# Patient Record
Sex: FEMALE | Race: WHITE | NOT HISPANIC OR LATINO | URBAN - METROPOLITAN AREA
[De-identification: names, ages, dates, MRNs, and addresses within clinical notes are randomized per-mention and may not be internally consistent; named-entity substitution may affect disease eponyms.]

---

## 2018-08-28 ENCOUNTER — OUTPATIENT (OUTPATIENT)
Dept: OUTPATIENT SERVICES | Facility: HOSPITAL | Age: 68
LOS: 1 days | Discharge: HOME | End: 2018-08-28

## 2018-08-28 VITALS — SYSTOLIC BLOOD PRESSURE: 112 MMHG | DIASTOLIC BLOOD PRESSURE: 62 MMHG | HEART RATE: 54 BPM | RESPIRATION RATE: 18 BRPM

## 2018-08-28 VITALS
DIASTOLIC BLOOD PRESSURE: 60 MMHG | HEART RATE: 56 BPM | SYSTOLIC BLOOD PRESSURE: 135 MMHG | RESPIRATION RATE: 15 BRPM | WEIGHT: 156.09 LBS | OXYGEN SATURATION: 97 % | TEMPERATURE: 97 F | HEIGHT: 64.96 IN

## 2018-08-28 DIAGNOSIS — Z12.11 ENCOUNTER FOR SCREENING FOR MALIGNANT NEOPLASM OF COLON: Chronic | ICD-10-CM

## 2018-08-28 NOTE — PRE-ANESTHESIA EVALUATION ADULT - NSANTHOSAYNRD_GEN_A_CORE
No. CHE screening performed.  STOP BANG Legend: 0-2 = LOW Risk; 3-4 = INTERMEDIATE Risk; 5-8 = HIGH Risk

## 2018-08-30 DIAGNOSIS — K64.4 RESIDUAL HEMORRHOIDAL SKIN TAGS: ICD-10-CM

## 2018-08-30 DIAGNOSIS — Z12.11 ENCOUNTER FOR SCREENING FOR MALIGNANT NEOPLASM OF COLON: ICD-10-CM

## 2019-07-08 PROBLEM — Z00.00 ENCOUNTER FOR PREVENTIVE HEALTH EXAMINATION: Status: ACTIVE | Noted: 2019-07-08

## 2019-09-16 ENCOUNTER — APPOINTMENT (OUTPATIENT)
Dept: GASTROENTEROLOGY | Facility: CLINIC | Age: 69
End: 2019-09-16

## 2019-12-30 ENCOUNTER — EMERGENCY (EMERGENCY)
Facility: HOSPITAL | Age: 69
LOS: 0 days | Discharge: HOME | End: 2019-12-30
Attending: EMERGENCY MEDICINE | Admitting: EMERGENCY MEDICINE
Payer: MEDICAID

## 2019-12-30 VITALS
RESPIRATION RATE: 18 BRPM | SYSTOLIC BLOOD PRESSURE: 122 MMHG | HEART RATE: 56 BPM | DIASTOLIC BLOOD PRESSURE: 64 MMHG | OXYGEN SATURATION: 97 % | TEMPERATURE: 98 F

## 2019-12-30 VITALS
SYSTOLIC BLOOD PRESSURE: 121 MMHG | RESPIRATION RATE: 18 BRPM | HEART RATE: 55 BPM | DIASTOLIC BLOOD PRESSURE: 53 MMHG | OXYGEN SATURATION: 97 %

## 2019-12-30 DIAGNOSIS — R10.31 RIGHT LOWER QUADRANT PAIN: ICD-10-CM

## 2019-12-30 DIAGNOSIS — R10.30 LOWER ABDOMINAL PAIN, UNSPECIFIED: ICD-10-CM

## 2019-12-30 DIAGNOSIS — Z12.11 ENCOUNTER FOR SCREENING FOR MALIGNANT NEOPLASM OF COLON: Chronic | ICD-10-CM

## 2019-12-30 LAB
ALBUMIN SERPL ELPH-MCNC: 3.9 G/DL — SIGNIFICANT CHANGE UP (ref 3.5–5.2)
ALP SERPL-CCNC: 60 U/L — SIGNIFICANT CHANGE UP (ref 30–115)
ALT FLD-CCNC: 17 U/L — SIGNIFICANT CHANGE UP (ref 0–41)
ANION GAP SERPL CALC-SCNC: 14 MMOL/L — SIGNIFICANT CHANGE UP (ref 7–14)
APPEARANCE UR: CLEAR — SIGNIFICANT CHANGE UP
AST SERPL-CCNC: 34 U/L — SIGNIFICANT CHANGE UP (ref 0–41)
BILIRUB DIRECT SERPL-MCNC: <0.2 MG/DL — SIGNIFICANT CHANGE UP (ref 0–0.2)
BILIRUB INDIRECT FLD-MCNC: >0.5 MG/DL — SIGNIFICANT CHANGE UP (ref 0.2–1.2)
BILIRUB SERPL-MCNC: 0.7 MG/DL — SIGNIFICANT CHANGE UP (ref 0.2–1.2)
BILIRUB UR-MCNC: NEGATIVE — SIGNIFICANT CHANGE UP
BUN SERPL-MCNC: 20 MG/DL — SIGNIFICANT CHANGE UP (ref 10–20)
CALCIUM SERPL-MCNC: 9.1 MG/DL — SIGNIFICANT CHANGE UP (ref 8.5–10.1)
CHLORIDE SERPL-SCNC: 105 MMOL/L — SIGNIFICANT CHANGE UP (ref 98–110)
CO2 SERPL-SCNC: 20 MMOL/L — SIGNIFICANT CHANGE UP (ref 17–32)
COLOR SPEC: SIGNIFICANT CHANGE UP
CREAT SERPL-MCNC: 0.7 MG/DL — SIGNIFICANT CHANGE UP (ref 0.7–1.5)
DIFF PNL FLD: SIGNIFICANT CHANGE UP
GLUCOSE SERPL-MCNC: 98 MG/DL — SIGNIFICANT CHANGE UP (ref 70–99)
GLUCOSE UR QL: NEGATIVE — SIGNIFICANT CHANGE UP
HCT VFR BLD CALC: 50 % — HIGH (ref 37–47)
HGB BLD-MCNC: 16.1 G/DL — HIGH (ref 12–16)
KETONES UR-MCNC: NEGATIVE — SIGNIFICANT CHANGE UP
LACTATE SERPL-SCNC: 0.7 MMOL/L — SIGNIFICANT CHANGE UP (ref 0.7–2)
LEUKOCYTE ESTERASE UR-ACNC: NEGATIVE — SIGNIFICANT CHANGE UP
LIDOCAIN IGE QN: 41 U/L — SIGNIFICANT CHANGE UP (ref 7–60)
MCHC RBC-ENTMCNC: 28.6 PG — SIGNIFICANT CHANGE UP (ref 27–31)
MCHC RBC-ENTMCNC: 32.2 G/DL — SIGNIFICANT CHANGE UP (ref 32–37)
MCV RBC AUTO: 88.8 FL — SIGNIFICANT CHANGE UP (ref 81–99)
NITRITE UR-MCNC: NEGATIVE — SIGNIFICANT CHANGE UP
NRBC # BLD: 0 /100 WBCS — SIGNIFICANT CHANGE UP (ref 0–0)
PH UR: 5.5 — SIGNIFICANT CHANGE UP (ref 5–8)
PLATELET # BLD AUTO: 405 K/UL — HIGH (ref 130–400)
POTASSIUM SERPL-MCNC: 5.3 MMOL/L — HIGH (ref 3.5–5)
POTASSIUM SERPL-SCNC: 5.3 MMOL/L — HIGH (ref 3.5–5)
PROT SERPL-MCNC: 7.2 G/DL — SIGNIFICANT CHANGE UP (ref 6–8)
PROT UR-MCNC: NEGATIVE — SIGNIFICANT CHANGE UP
RBC # BLD: 5.63 M/UL — HIGH (ref 4.2–5.4)
RBC # FLD: 14.6 % — HIGH (ref 11.5–14.5)
SODIUM SERPL-SCNC: 139 MMOL/L — SIGNIFICANT CHANGE UP (ref 135–146)
SP GR SPEC: 1.02 — SIGNIFICANT CHANGE UP (ref 1.01–1.02)
UROBILINOGEN FLD QL: SIGNIFICANT CHANGE UP
WBC # BLD: 6.49 K/UL — SIGNIFICANT CHANGE UP (ref 4.8–10.8)
WBC # FLD AUTO: 6.49 K/UL — SIGNIFICANT CHANGE UP (ref 4.8–10.8)

## 2019-12-30 PROCEDURE — 99284 EMERGENCY DEPT VISIT MOD MDM: CPT

## 2019-12-30 PROCEDURE — 74177 CT ABD & PELVIS W/CONTRAST: CPT | Mod: 26

## 2019-12-30 NOTE — ED PROVIDER NOTE - OBJECTIVE STATEMENT
69 year old female, no past medical history, who presents with intermittent, non-radiating suprapubic pain x1 month. Denies fever, chills, n/v/d, vaginal discharge/bleeding, dysuria/hematuria, rash. Pt reports hx similar pain and was diagnosed with UTI at that time. No hx abdominal surgeries. Pain worse after bowel movement, improved with advil. 69 year old female, no past medical history, who presents with intermittent, non-radiating suprapubic pain x1 month. Denies fever, chills, n/v/d, vaginal discharge/bleeding, dysuria/hematuria, rash. Pt reports hx similar pain and was diagnosed with UTI at that time. Pain worse after urination, moderately improved with Advil. No hx abdominal surgeries. Colonoscopy 8/2018, no significant findings. 69 year old female, no past medical history, who presents with intermittent, non-radiating suprapubic pain x1 month. Denies fever, chills, n/v/d, vaginal discharge/bleeding, dysuria/hematuria, rash. Pt reports hx similar pain and was diagnosed with UTI at that time. Pain worse after urination, moderately improved with Advil. No hx abdominal surgeries. Colonoscopy 8/2018 without significant findings.

## 2019-12-30 NOTE — ED PROVIDER NOTE - PROGRESS NOTE DETAILS
I personally supervised CHECO Baptiste and agree with her assessment and plan Patient and daughter sts wants to AMA from ED. Pt dully understands ricks of leaving against medical advice. Return precautions given. PT REFUSES TO WAIT FOR CT SCAN. PT REQUESTS DAUGHTER AS  AND REFUSES  PHONE. PT REPORTS SHE HAS AN EYE DOCTOR APPT AND DOES NOT WANT TO MISS IT. PT GIVEN STRICT RETURN INSTRUCTIONS AND INFORMED OF SIGNING AMA. PT AWARE FO RISK OF LEAVING AND WILL RETURN FOR ANY CONCERNS.

## 2019-12-30 NOTE — ED PROVIDER NOTE - PATIENT PORTAL LINK FT
You can access the FollowMyHealth Patient Portal offered by [facility] by registering at the following website: [email or web address]. By joining [employer]’s FollowMyHealth portal, you will also be able to view your health information using other applications (apps) compatible with our system. You can access the FollowMyHealth Patient Portal offered by [facility] by registering at the following website: [email or web address]. By joining [employer]’s FollowMyHealth portal, you will also be able to view your health information using other applications (apps) compatible with our system.

## 2019-12-30 NOTE — ED PROVIDER NOTE - ATTENDING CONTRIBUTION TO CARE
I personally evaluated the patient. I reviewed the Resident’s or Physician Assistant’s note (as assigned above), and agree with the findings and plan except as documented in my note.   70 Y/O F C/O LOWER ABD PAIN X 3 MONTHS. PAIN IS WORSE IN THE MORNINGS AFTER URINATING AND THEN IMPROVES OVER THE COURSE OF THE DAY. PAIN ALSO IMPROVES WITH MOTRIN.  PAIN IS NONRADIATING AND IS DULL. PT WITH [facility] 2/2019 AND WAS DIAGNOSED WITH A UTI. NO DYSURIA, HEMATURIA, FREQUENCY. NO BACK PAIN. NO FEVER, CHILLS. NO N/V. NORMAL BMS. VITALS NOTED. ALERT OX3 NAD WELL APPEARING. NCAT PERRL. EOMI. OP NORMAL. MMM. NECK SUPPLE. LUNGS CLEAR B/L. RRR. S1S2. ABD- SOFT + MILD LOWER ABD TENDERNESS, RLQ>LLQ. NO REBOUND OR GUARDING. BACK NONTENDER. NO CVAT B/L. NO LEG EDEMA. CN 2-12 INTACT. NEURO EXAM NONFOCAL.

## 2019-12-30 NOTE — ED PROVIDER NOTE - NS ED ROS FT
Review of Systems:  	•	CONSTITUTIONAL: no fever, no diaphoresis, no chills  	•	SKIN: no rash  	•	HEMATOLOGIC: no bleeding, no bruising  	•	ENT: no sore throat   	•	RESPIRATORY: no shortness of breath, no cough  	•	CARDIAC: no chest pain, no palpitations  	•	GI: +suprapubic pain. no nausea, vomiting, diarrhea, constipation   	•	GENITO-URINARY: no dysuria, hematuria, increased urinary frequency/urgency. no vaginal discharge or bleeding   	•	MUSCULOSKELETAL: no joint paint, no swelling, no redness  	•	NEUROLOGIC: no headache, no dizziness, no weakness   	•	PSYCH: no anxiety, non suicidal, non homicidal, no hallucination, no depression

## 2019-12-30 NOTE — ED PROVIDER NOTE - PHYSICAL EXAMINATION
CONST: Well appearing in NAD  EYES: PERRL, EOMI, Sclera and conjunctiva clear.   ENT: Oropharynx normal appearing, no erythema or exudates. No abscess or swelling. Uvula midline.   NECK: Non-tender. normal ROM. supple   CARD: Normal S1 S2; Normal rate and rhythm  RESP: Equal BS B/L, No wheezes, rhonchi or rales. No distress. Speaking full sentences.   GI: Abdomen Soft, non-tender, non-distended. no rebound or guarding. no overlying ecchymosis or abrasions. no cva tenderness. normal BS  MS: Normal ROM in all extremities. No midline spinal tenderness.   SKIN: Warm, dry, no acute rashes. Good turgor  NEURO: A&Ox3, No focal deficits. Strength 5/5 with no sensory deficits. Steady gait.

## 2019-12-30 NOTE — ED PROVIDER NOTE - CLINICAL SUMMARY MEDICAL DECISION MAKING FREE TEXT BOX
70 Y/O F C/O 3 MONTHS OF ABD PAIN. ALL DIAGNOSTIC TESTING REVIEWED. CT SCAN WITH NO ACUTE PATHOLOGY. PT INSTRUCTED TO FOLLOW UP WITH PMD AND GIVEN STRICT RETURN INSTRUCTIONS.

## 2020-01-01 LAB
CULTURE RESULTS: SIGNIFICANT CHANGE UP
SPECIMEN SOURCE: SIGNIFICANT CHANGE UP

## 2020-01-02 NOTE — ED POST DISCHARGE NOTE - DETAILS
CALLED PHARMACIST ON RECORD AND THEY HAVE SAME PHONE NUMBER. CEFTIN CALLED INTO PHARMACIST FOR UTI. PHARMACIST WILL TRY AND CONTACT PATIENT AND TELL PATIENT THEY HAVE A UTI. I LEFT A MESSAGE REGARDING THIS ISSUE ON CELL PHONE LISTED. WILL CONTINUE TO TRY AND CONTACT. FAMILY CALLED BACK AND WILL  MEDS TONIGHT.

## 2023-10-22 NOTE — PRE-ANESTHESIA EVALUATION ADULT - NSANTHAPLANRD_GEN_ALL_CORE
RX ED Progress Note: Vancomycin Consult Acceptance      Indication for therapy: Pneumonia    ALLERGIES:   Allergen Reactions   • Penicillins ANAPHYLAXIS   • Adhesive   (Environmental) PRURITUS   • Aspirin GI UPSET   • Codeine VOMITING   • Depakote Other (See Comments)   • Erythrocin HIVES   • Seasonal Other (See Comments)   • Sulfa Antibiotics HIVES   • Tetracycline HIVES       Most recent height and weight information:  Weight: (!) 162 kg (10/22/23 1224)       The Following are the calculated  Current Weights for Naab, Jeanette L     Adjusted Ideal    None None             Labs:  Serum Creatinine and Creatinine Clearance:  Creatinine clearance cannot be calculated (Unknown ideal weight.)    Maximum Temperature (last 24 hours)     Value Max    Temp 98.4 °F (36.9 °C)        WBC (K/mcL)   Date/Time Value   10/22/2023 1256 13.4 (H)     Microbiology Results     None            Assessment/Plan:  Briefly, this is a 44 year old female started on vancomycin for Pneumonia, indicated for one time vancomycin dose in the Emergency department. Initial dose will be Vancomycin 2000 mg once.    If vancomycin therapy is appropriate to be resumed, the inpatient provider will need to order a Pharmacy to dose and monitor vancomycin therapy consult if pharmacist monitoring of pertinent labs and drug levels is desired.    Thank you,    eJssica Herman RPH  10/22/2023 1:42 PM     monitored anesthesia care (MAC)

## 2025-07-25 NOTE — ED ADULT NURSE NOTE - CAS EDN DISCHARGE INTERVENTIONS
Initial SW/CM Assessment/Plan of Care Note     Baseline Assessment  57 year old admitted 7/24/2025 as Inpatient with a diagnosis of ETOH withdrawal.   Prior to admission patient was living with Significant other and residing at Apartment    .  Patient does not  have a Power of  for Healthcare.   Patient’s Primary Care Provider is Radha Calderon MD.     Progress Note  Patient admitted with ETOH withdrawal, elevated LA.  IVF, CIWA protocol.      CM assessment completed via chart review and RN rounds. Patient well known to CM department. This is her 12th admit for 2025. Chemical dependency and provided resources in the past but does not typically go to treatment.     Patient lives home with significant other in a PADS apartment in [place]. Independent w/ all ADLs, no services, no DME.     CM offered ETOH resources again. Pt declined.     CM discussed establishing a POAH. Offered  services. Pt declined. States she needs to talk with her friend Kike, to determine if he would be agreeable.   No CM needs anticipated.    Using [facility] on [address] in [place] for weekend dc. Using [facility] for weekday dc.     Medivan loaded to [address] [place] [address].     DCP: Return home w/sig other. No needs  Medivan loaded.     Plan  Patient/Family Discharge Goal: Home   Is patient/family goal achievable: Yes    SW/CM - Recommendations for Discharge: Home     Barriers to Discharge  Identified Barriers to Discharge/Transition Planning: Medical necessity for acute care        Refer to SW/CM Flowsheet for objective data.     Medical History  Past Medical History:   Diagnosis Date    Alcoholism  (CMD)     last in hosp  2 weeks ago approx  1/07/2024    Anemia, iron deficiency     Anxiety     Depression     Essential (primary) hypertension     GERD (gastroesophageal reflux disease)     High cholesterol     History of seizure 04/09/2021    Lung cancer  (CMD) 02/2021    right, radiation therap completed 2021     Marijuana smoker     Nausea and vomiting in adult 09/23/2021    Pancreatitis, acute (CMD) 03/23/2023    Port-A-Cath in place 2022    last chemo dec 2022    RAD (reactive airway disease) (CMD)     Seizure disorder  (CMD)     last known seizure was 8-9 months ago    Seizures  (CMD)     Stroke  (CMD) 2014    Substance abuse (CMD)     Suicide attempt 03/15/2023    Vitamin D deficiency        Prior to Admission Status  Functional Status  Ambulation: Independent/Self  Bathing: Independent/Self  Dressing: Independent/Self  Toileting: Independent/Self  Meal Preparation: Independent/Self  Medication Preparation: Independent/Self  Medication Administration: Independent/Self  Housekeeping: Independent/Self  Laundry: Independent/Self    Agency/Support  Type of Services Prior to Hospitalization: None               Support Systems: /, Significant other   Home Devices/Equipment: None           Mobility Assist Devices: None  Sensory Support Devices: Contacts    Prior Function                Current Function  Last Filed Values       None            Therapy Recommendations for Discharge:   PT:      Last Filed Values       None          OT:       Last Filed Values       None          SLP:    Last Filed Values       None            Insurance  Primary: [facility]  Secondary: N/A    Disposition Recommendations:  HEENA/ROXANNA recommendation for discharge: Home     30 Day Readmission:                 IV discontinued, cath removed intact